# Patient Record
Sex: FEMALE | Race: WHITE | NOT HISPANIC OR LATINO | Employment: STUDENT | URBAN - METROPOLITAN AREA
[De-identification: names, ages, dates, MRNs, and addresses within clinical notes are randomized per-mention and may not be internally consistent; named-entity substitution may affect disease eponyms.]

---
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
## 2020-03-16 PROBLEM — N94.2 VAGINISMUS: Status: RESOLVED | Noted: 2019-05-13 | Resolved: 2020-03-16

## 2020-04-08 DIAGNOSIS — R61 PERSPIRATION EXCESSIVE: ICD-10-CM

## 2020-04-08 RX ORDER — ALUMINUM CHLORIDE 20 %
SOLUTION, NON-ORAL TOPICAL
Qty: 35 ML | Refills: 3 | Status: SHIPPED | OUTPATIENT
Start: 2020-04-08 | End: 2021-04-06

## 2020-04-08 NOTE — TELEPHONE ENCOUNTER
drysol soln.      Last Written Prescription Date:  11/29/19  Last Fill Quantity: 30 ml,   # refills: 3  Last Office Visit: 11/29/19 with Dr. Canada  Future Office visit:       Routing refill request to provider for review/approval because:  Pediatric protocol

## 2020-07-09 DIAGNOSIS — Z00.129 ENCOUNTER FOR ROUTINE CHILD HEALTH EXAMINATION W/O ABNORMAL FINDINGS: ICD-10-CM

## 2020-07-09 RX ORDER — NORGESTIMATE AND ETHINYL ESTRADIOL 0.25-0.035
KIT ORAL
Qty: 84 TABLET | Refills: 0 | Status: SHIPPED | OUTPATIENT
Start: 2020-07-09

## 2020-07-09 NOTE — TELEPHONE ENCOUNTER
Medication is being filled for 1 time refill only due to:  Patient needs to be seen because it has been more than one year since last visit.   Left message on answering machine for patient to call back to schedule follow up appt.    Charlotte Rucker RN

## 2020-09-24 DIAGNOSIS — Z00.129 ENCOUNTER FOR ROUTINE CHILD HEALTH EXAMINATION W/O ABNORMAL FINDINGS: ICD-10-CM

## 2020-09-24 NOTE — TELEPHONE ENCOUNTER
Left message on answering machine for patient to call back to schedule follow up appt.  (2nd request)  Will wait to fill.  Charlotte Rucker RN

## 2020-10-09 NOTE — TELEPHONE ENCOUNTER
Pt has not been seen by GYN since 3/2019.   Last saw Dr. Canada in 11/2019.    Will route to Dr. Canada's office to see if she wants to approve.    Joanne SONI RN

## 2020-10-09 NOTE — TELEPHONE ENCOUNTER
Pending Prescriptions:                       Disp   Refills    SPRINTEC 28 0.25-35 MG-MCG tablet [Pharmac*84 tab*0        Sig: TAKE 1 TABLET BY MOUTH EVERY DAY    Please see message below and advise    Routing refill request to provider for review/approval because:  Previously prescribed by OB/Gyn

## 2020-10-10 NOTE — TELEPHONE ENCOUNTER
Please contact patient and see if she would be willing to schedule a follow up appointment within next month.  I am willing to provide one prescription to allow time to re-establish care if appointment scheduled.  The appointment would need to be with alternative provide as I do not see 20 year olds.      Background of not being seen by prescribing service (OB) or pediatrics in > 1 year and no response obvious to message in August that she was on last refill till seen.

## 2020-10-12 RX ORDER — NORGESTIMATE AND ETHINYL ESTRADIOL 0.25-0.035
KIT ORAL
Qty: 84 TABLET | Refills: 0 | OUTPATIENT
Start: 2020-10-12

## 2020-10-12 NOTE — TELEPHONE ENCOUNTER
Attempted to contact patient. Left voice message to call back.     Also called patient's mom-on consent to communicate. Mom states patient is at college, but does not think patient is on birth control pills anymore. She states she will inform patient to schedule an appointment if she wants to resume birth control.     Request denied.

## 2021-02-14 ENCOUNTER — OFFICE VISIT (OUTPATIENT)
Dept: URGENT CARE | Facility: URGENT CARE | Age: 21
End: 2021-02-14
Payer: COMMERCIAL

## 2021-02-14 VITALS
WEIGHT: 125 LBS | DIASTOLIC BLOOD PRESSURE: 80 MMHG | HEART RATE: 90 BPM | TEMPERATURE: 98.1 F | BODY MASS INDEX: 20.83 KG/M2 | SYSTOLIC BLOOD PRESSURE: 118 MMHG | HEIGHT: 65 IN | RESPIRATION RATE: 12 BRPM

## 2021-02-14 DIAGNOSIS — K64.4 EXTERNAL HEMORRHOIDS: Primary | ICD-10-CM

## 2021-02-14 PROCEDURE — 99213 OFFICE O/P EST LOW 20 MIN: CPT | Performed by: PHYSICIAN ASSISTANT

## 2021-02-14 RX ORDER — TRIAMCINOLONE ACETONIDE 1 MG/G
CREAM TOPICAL
COMMUNITY
Start: 2021-02-10 | End: 2021-04-06

## 2021-02-14 ASSESSMENT — MIFFLIN-ST. JEOR: SCORE: 1337.88

## 2021-02-14 NOTE — PROGRESS NOTES
"URGENT CARE VISIT:    SUBJECTIVE:   Nelly Sanchez is a 20 year old female who presents with anal itching since 2 week(s) ago. Denies fever, constipation, changes in stool, abdominal pain and pelvic pain. Symptoms have been stable.  Treatment tried include none with no relief of symptoms. Hx of hemorrhoids.    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    Hymenectomy     Rectal prolapse 11/02    Rectal biopsy, scarification procedure     Allergies: No known allergies   Medications:   Current Outpatient Medications   Medication Sig Dispense Refill     SPRINTEC 28 0.25-35 MG-MCG tablet TAKE 1 TABLET BY MOUTH EVERY DAY 84 tablet 0     DRYSOL 20 % external solution APPLY TO AFFECTED AREA EVERY DAY AT BEDTIME (Patient not taking: Reported on 2/14/2021) 35 mL 3     triamcinolone (KENALOG) 0.1 % external cream        Social History:   Social History     Tobacco Use     Smoking status: Never Smoker     Smokeless tobacco: Never Used     Tobacco comment: No one smokes in family.   Substance Use Topics     Alcohol use: No         ROS:   Review of systems negative except as stated above.    OBJECTIVE:  /80   Pulse 90   Temp 98.1  F (36.7  C) (Oral)   Resp 12   Ht 1.651 m (5' 5\")   Wt 56.7 kg (125 lb)   LMP 02/01/2021   Breastfeeding No   BMI 20.80 kg/m    GENERAL APPEARANCE: healthy, alert and no distress  CV: regular rates and rhythm, normal S1 S2, no murmur noted  ABDOMEN:  soft, nontender, no HSM or masses and bowel sounds normal  Rectal exam: external hemorrhoids noted at 5 oclock  SKIN: no suspicious lesions or rashes  Genitourinary: external genitalia normal     ASSESSMENT:    ICD-10-CM    1. External hemorrhoids  K64.4        PLAN:  Patient Instructions   Patient was educated on the natural course of hemorrhoids.  Conservative measures discussed including hemorrhoid cream (Preparation H), Hydrocortisone cream, sitz bath, and high fiber diet. See your primary care provider " if symptoms do not improve in one week. Seek emergency care if you develop severe pain or bleeding     Patient verbalized understanding and is agreeable to plan. The patient was discharged ambulatory and in stable condition.    Sonia Alanis PA-C ....................  2/14/2021   10:30 AM

## 2021-02-14 NOTE — PATIENT INSTRUCTIONS
Patient was educated on the natural course of hemorrhoids.  Conservative measures discussed including hemorrhoid cream (Preparation H), Hydrocortisone cream, sitz bath, and high fiber diet. See your primary care provider if symptoms do not improve in one week. Seek emergency care if you develop severe pain or bleeding     Patient Education     Hemorrhoids    Hemorrhoids are swollen and inflamed veins inside the rectum and near the anus. The rectum is the last several inches of the colon. The anus is the passage between the rectum and the outside of the body.   Causes  The veins can become swollen due to increased pressure in them. This is most often caused by:     Chronic constipation or diarrhea    Straining when having a bowel movement    Sitting too long on the toilet    A low-fiber diet    Pregnancy  Symptoms    Bleeding from the rectum. You may notice this after bowel movements.    Lump near the anus    Itching around the anus    Pain around the anus    Mucus leaks from the anus  There are different types of hemorrhoids. Depending on the type you have and the severity, you may be able to treat yourself at home. In some cases, a procedure may be the best treatment option. Your healthcare provider can tell you more about this, if needed.   Home care  General care    To get relief from pain or itching, try:  ? Medicines. Your healthcare provider may recommend stool softeners, suppositories, or laxatives to help manage constipation. Use these exactly as directed.  ? Sitz baths. A sitz bath involves sitting in a few inches of warm bath water. Be careful not to make the water so hot that you burn yourself--test it before sitting in it. Soak for about 10 to 15 minutes a few times a day. This may help relieve pain.  ? Topical products. Your healthcare provider may prescribe or recommend creams, ointments, or pads that can be applied to the hemorrhoid. Use these exactly as directed.  Tips to help prevent hemorrhoids      Eat more fiber. Fiber adds bulk to stool and absorbs water as it moves through your colon. This makes stool softer and easier to pass.  ? Increase the fiber in your diet with more fiber-rich foods. These include fresh fruit, vegetables, and whole grains.  ? Take a fiber supplement or bulking agent, if advised by your healthcare provider. These include products such as psyllium or methylcellulose.    Drink more water. Your healthcare provider may direct you to drink plenty of water. This can help keep stool soft.    Be more active. Frequent exercise aids digestion and helps prevent constipation. It may also help make bowel movements more regular.    Don t strain during bowel movements. This can make hemorrhoids more likely. Also, don t sit on the toilet for long periods of time.    Follow-up care  Follow up with your healthcare provider as advised. If a culture or imaging tests were done, someone will let you know the results when they are ready. This may take a few days or longer. If your healthcare provider recommends a procedure for your hemorrhoids, these options can be discussed. Options may include surgery and outpatient office treatments.   When to seek medical advice  Call your healthcare provider right away if any of these occur:     Increased bleeding from the rectum    Increased pain around the rectum or anus    Weakness or dizziness  Call 911  Call 911 if any of these occur:     Trouble breathing or swallowing    Fainting or loss of consciousness    Unusually fast heart rate    Vomiting blood    Large amounts of blood in stool or black, tarry stools  Cathie last reviewed this educational content on 8/1/2019 2000-2020 [employer]. [address] [place] [address], [place] [address]. All rights reserved. This information is not intended as a substitute for professional medical care. Always follow your healthcare professional's instructions.

## 2021-02-19 ENCOUNTER — OFFICE VISIT (OUTPATIENT)
Dept: OBGYN | Facility: CLINIC | Age: 21
End: 2021-02-19
Payer: COMMERCIAL

## 2021-02-19 VITALS
WEIGHT: 128.2 LBS | BODY MASS INDEX: 21.33 KG/M2 | DIASTOLIC BLOOD PRESSURE: 76 MMHG | SYSTOLIC BLOOD PRESSURE: 124 MMHG | HEART RATE: 93 BPM

## 2021-02-19 DIAGNOSIS — L30.9 VULVAR DERMATITIS: Primary | ICD-10-CM

## 2021-02-19 DIAGNOSIS — B37.31 YEAST INFECTION OF THE VAGINA: Primary | ICD-10-CM

## 2021-02-19 DIAGNOSIS — N89.8 VAGINAL IRRITATION: ICD-10-CM

## 2021-02-19 LAB
SPECIMEN SOURCE: ABNORMAL
WET PREP SPEC: ABNORMAL

## 2021-02-19 PROCEDURE — 99214 OFFICE O/P EST MOD 30 MIN: CPT | Performed by: OBSTETRICS & GYNECOLOGY

## 2021-02-19 PROCEDURE — 87591 N.GONORRHOEAE DNA AMP PROB: CPT | Performed by: OBSTETRICS & GYNECOLOGY

## 2021-02-19 PROCEDURE — 87210 SMEAR WET MOUNT SALINE/INK: CPT | Performed by: OBSTETRICS & GYNECOLOGY

## 2021-02-19 PROCEDURE — 87491 CHLMYD TRACH DNA AMP PROBE: CPT | Performed by: OBSTETRICS & GYNECOLOGY

## 2021-02-19 RX ORDER — CLOBETASOL PROPIONATE 0.5 MG/G
OINTMENT TOPICAL 2 TIMES DAILY
Qty: 60 G | Refills: 0 | Status: SHIPPED | OUTPATIENT
Start: 2021-02-19 | End: 2021-03-05

## 2021-02-19 RX ORDER — FLUCONAZOLE 150 MG/1
150 TABLET ORAL ONCE
Qty: 1 TABLET | Refills: 0 | Status: SHIPPED | OUTPATIENT
Start: 2021-02-19 | End: 2021-02-19

## 2021-02-19 RX ORDER — ULIPRISTAL ACETATE 30 MG/1
TABLET ORAL
COMMUNITY
Start: 2021-01-15

## 2021-02-19 SDOH — HEALTH STABILITY: MENTAL HEALTH: HOW MANY STANDARD DRINKS CONTAINING ALCOHOL DO YOU HAVE ON A TYPICAL DAY?: NOT ASKED

## 2021-02-19 SDOH — HEALTH STABILITY: MENTAL HEALTH: HOW OFTEN DO YOU HAVE A DRINK CONTAINING ALCOHOL?: NOT ASKED

## 2021-02-19 SDOH — HEALTH STABILITY: MENTAL HEALTH: HOW OFTEN DO YOU HAVE 6 OR MORE DRINKS ON ONE OCCASION?: NOT ASKED

## 2021-02-19 NOTE — NURSING NOTE
"Chief Complaint   Patient presents with     Vaginal Problem     itching outer labia X 3 weeks   Seen 2 times at [facility] for         Initial /76 (BP Location: Left arm, Cuff Size: Adult Regular)   Pulse 93   Wt 58.2 kg (128 lb 3.2 oz)   LMP 2021 (Exact Date)   Breastfeeding No   BMI 21.33 kg/m   Estimated body mass index is 21.33 kg/m  as calculated from the following:    Height as of 21: 1.651 m (5' 5\").    Weight as of this encounter: 58.2 kg (128 lb 3.2 oz).  BP completed using cuff size: regular    Questioned patient about current smoking habits.  Pt. has never smoked.          The following HM Due: NONE      Piedad Ryan CMA on 2021 at 1:24 PM       "

## 2021-02-19 NOTE — RESULT ENCOUNTER NOTE
Inform patient that there was yeast found on her wet prep.   She is to take a medication to help treat this and I will prescribe it to her.     Elif Dill MD

## 2021-02-19 NOTE — PROGRESS NOTES
Vaginal irritation/itching    Ms. Nelly MARIO Relling 20 year old G0 presents for vaginal irritation of 3 weeks duration. She had sought medical evaluation at her [facility] clinic and they performed wet prep testing that was negative. She was prescribed topical triamcinolone cream BID dosing x 2 weeks and reports only minimal improvement in symptoms. Of note, she has a history of vaginismus with remote history of hymenectomy. She is sexually active with a male partner but is not engaging at the moment in penetrative intercourse but oral intercourse with her male partner. She has tried to make adjustments to healthier vulvar hygiene practices as counseled to her by her [facility] clinic RN, but this has not lead to any changes in her symptoms. She denies vaginal odor and discharge.     Of note, in the midst of discussing evaluation for her concerns, she expressed that she would decline speculum exam or insertion of vaginal swab into the vagina in part because of her vaginismus issues.       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 Trent Muro - [facility]        Current Outpatient Medications   Medication     clobetasol (TEMOVATE) 0.05 % external ointment     SPRINTEC 28 0.25-35 MG-MCG tablet     triamcinolone (KENALOG) 0.1 % external cream     DRYSOL 20 % external solution     NELLY 30 MG tablet     No current facility-administered medications for this visit.        Allergies   Allergen Reactions     No Known Allergies        Social History     Tobacco Use     Smoking status: Never Smoker     Smokeless tobacco: Never Used     Tobacco comment: No one smokes in family.   Substance Use Topics     Alcohol use: Yes     Comment: socially     Drug use: No        Family History   Problem Relation Age of Onset     Thyroid Disease Mother         hypo thyroid     Lipids Mother      Lipids Father        C: NEGATIVE for fever, chills, change in weight  HEENT: NEGATIVE for visual changes, runny nose, epistaxis, ear pain, tinnitus, tooth ache, sore throat, difficulty with swallowing, sinus pain  R: NEGATIVE for significant cough or SOB  CV: NEGATIVE for chest pain, palpitations or peripheral edema  BREAST: NEGATIVE For soreness, pain, lumps or nipple discharge  GI: NEGATIVE for nausea, abdominal pain, heartburn, or change in bowel habits  : NEGATIVE for frequency, dysuria, hematuria, vaginal discharge, or irregular bleeding  Neuro: NEGATIVE for numbness, tingling, focal weakness, or headache  INT: NEGATIVE for rashes, lesions or pruritis   PSYCH: NEGATIVE for anxiety, depression, or halluciinations    Exam:   /76 (BP Location: Left arm, Cuff Size: Adult Regular)   Pulse 93   Wt 58.2 kg (128 lb 3.2 oz)   LMP 02/01/2021 (Exact Date)   Breastfeeding No   BMI 21.33 kg/m    General Appearance: Well nourished, well developed female, NAD, AOx3  Neurological: Mental Status Normal and Station and Gait Normal   Skin: Normal skin turgor  HEET: Atraumatic, normocephalic, EOMI  Neck: Supple,no adenopathy,thyroid normal  Lungs: Good respiratory effort  Abdomen: Soft, NT, ND, no masses  Pelvic: External female genitalia remarkable for mild erythema noted in the vulva bilaterally within the region bordering the labia majora and minora. Also mild erythema noted in the perineum.  No other external lesions, normal hair distribution, no adenopathy. Speculum exam and bimanual exam deferred  Extremities: No clubbing, no cyanosis and no edema    A/P: Vulvar dermitis  -- based on clinical exam and mild improvement noted with triamcinolone  -- recommended higher potency topical steroid and therefore, clobetasol ointment prescribed; made it a point that ointment would be more affective  than cream given alcohol base related to cream formulation which could elicit more irritation; tapering regimen recommended  -- self collect wet prep and urine GC/CT cultures collected; will treat accordingly based on results    Total time spent was 30 minutes on the date of the encounter in chart review, patient visit, review of tests, documentation and counseling on the above medical condition.    Elif Dill MD  [facility]

## 2021-02-19 NOTE — RESULT ENCOUNTER NOTE
Also, I would recommend that she hold off on the steroid ointment and if the diflucan treats her condition then she does not need to start the steroid ointment     Elif Dill MD,

## 2021-04-06 ENCOUNTER — OFFICE VISIT (OUTPATIENT)
Dept: OBGYN | Facility: CLINIC | Age: 21
End: 2021-04-06
Payer: COMMERCIAL

## 2021-04-06 VITALS
WEIGHT: 119 LBS | SYSTOLIC BLOOD PRESSURE: 120 MMHG | DIASTOLIC BLOOD PRESSURE: 68 MMHG | HEART RATE: 64 BPM | HEIGHT: 65 IN | BODY MASS INDEX: 19.83 KG/M2

## 2021-04-06 DIAGNOSIS — Z12.4 PAP SMEAR FOR CERVICAL CANCER SCREENING: ICD-10-CM

## 2021-04-06 DIAGNOSIS — N90.89 VULVAR IRRITATION: Primary | ICD-10-CM

## 2021-04-06 DIAGNOSIS — N94.10 DYSPAREUNIA IN FEMALE: ICD-10-CM

## 2021-04-06 LAB
SPECIMEN SOURCE: NORMAL
WET PREP SPEC: NORMAL

## 2021-04-06 PROCEDURE — 87210 SMEAR WET MOUNT SALINE/INK: CPT | Performed by: OBSTETRICS & GYNECOLOGY

## 2021-04-06 PROCEDURE — G0145 SCR C/V CYTO,THINLAYER,RESCR: HCPCS | Performed by: OBSTETRICS & GYNECOLOGY

## 2021-04-06 PROCEDURE — 99214 OFFICE O/P EST MOD 30 MIN: CPT | Performed by: OBSTETRICS & GYNECOLOGY

## 2021-04-06 PROCEDURE — 87102 FUNGUS ISOLATION CULTURE: CPT | Performed by: OBSTETRICS & GYNECOLOGY

## 2021-04-06 RX ORDER — LIDOCAINE 50 MG/G
OINTMENT TOPICAL
Qty: 30 G | Refills: 2 | Status: SHIPPED | OUTPATIENT
Start: 2021-04-06

## 2021-04-06 ASSESSMENT — MIFFLIN-ST. JEOR: SCORE: 1305.66

## 2021-04-06 NOTE — PROGRESS NOTES
SUBJECTIVE:       Nelly Sanchez is a 21 year old female  who presents to clinic today for persistent vulvar itching and also inability to have intercourse due to pain with insertion.    Prior vulvar dermatoses diagnosed? NO  Duration of symptoms: few months  Location of symptoms: introitus    Itching? YES started out very intense, now occasional and actually none today.  Redness/change in color? NO  Fissures, sores, lesions? NO  Pain with intercourse? YES has not been able to have vaginal intercourse   Discharge? NO  Odor? NO    Treatments tried: OTC antifungal and Diflucan 150mg po once. Was first treated with diflucan, then did over the counter monistat. Felt better in both cases for a few days, then feels that symptoms returned. They are much milder now than at first. One wet prep was positive at her school clinic. She was treated with a double dose of diflucan and feels that helped the most. She sat in a bath with baking soda yesterday and feels that helped a lot.    Treatments tried for vaginismus/pain with attempting intercourse: pelvic floor PT and dilators. Can now use a dilator without too much pain. Can use tampons. Has not had a pap smear or pelvic exam before.    Inciting event or infection?   YES as above.    Problem list and histories reviewed & adjusted, as indicated.  Additional history: as documented.    Patient Active Problem List   Diagnosis     Family history of hypercholesterolemia     Chronic constipation     Shoulder problem     Right shoulder pain, unspecified chronicity     Tight hymenal ring     Past Surgical History:   Procedure Laterality Date     AS PARTIAL HYMENECTOMY/REVISION HYMENAL RING  2018      RECTAL SCARIFICATION      Dr. Trent Muro - [facility]       Social History     Tobacco Use     Smoking status: Never Smoker     Smokeless tobacco: Never Used     Tobacco comment: No one smokes in family.   Substance Use Topics     Alcohol use: Yes      Comment: socially      Problem (# of Occurrences) Relation (Name,Age of Onset)    Lipids (2) Mother, Father    Thyroid Disease (1) Mother: hypo thyroid            SPRINTEC 28 0.25-35 MG-MCG tablet, TAKE 1 TABLET BY MOUTH EVERY DAY  NELLY 30 MG tablet,     No current facility-administered medications on file prior to visit.     Allergies   Allergen Reactions     No Known Allergies        Further ROS: Skin: No rashes,worrisome lesions or skin problems      OBJECTIVE:     Exam:  Constitutional:  Appearance: Well nourished, well developed alert, in no acute distress  Chest:  Respiratory Effort:  Breathing unlabored  Skin:General Inspection:  No rashes present, no lesions present, no areas of discoloration  Neurologic/Psychiatric:  Mental Status:  Oriented X3     Mons pubis Normal   Groin Normal   Labia majora Normal   Perineum Normal   Anus Normal   Labia minora Normal   Prepuce Normal   Clitoris Normal   Intralabial folds Very slight erythema  Vestibule Abnormal: slightly erythematous, nontender to q tip touch, slightly tender to digital touch   Urethral meatus Normal   Vagina Normal   Pelvic floor tone normal to slightly increased   I was able to visualize the cervix with a small pediatric speculum, and pap smear was done.      In-Clinic Test Results:  No results found for this or any previous visit (from the past 24 hour(s)).    ASSESSMENT/PLAN:                                                        ICD-10-CM    1. Vulvar irritation  N90.89 lidocaine (XYLOCAINE) 5 % external ointment     Wet prep     Yeast culture   2. Dyspareunia in female  N94.10 lidocaine (XYLOCAINE) 5 % external ointment     Yeast culture   3. Pap smear for cervical cancer screening  Z12.4 Pap imaged thin layer screen only - recommended age 21 - 24 years         Discussed that vulvar/vaginal pain of a chronic nature frequently is multifactorial in origin, and therefore often requires a multidisciplinary approach for optimal treatment. This may  include medication (hormonal and otherwise), physical therapy, pain clinic, and in some refractory cases, psychologic treatment. Because this develops over a period of time, there will not be an immediate recovery with any of these options over the short term. The goal is always to maximize function and minimize symptoms, to the extent that is possible.     Risks, benefits, and alternatives to options were discussed today. She was given written information on vulvar pain and vulvar hygiene. Decision was made to proceed with yeast culture to rule out non Candida, and to try application of lidocaine at the introitus before attemtping intercourse.  If she continues to have vulvar symptoms despite negative yeast cultures, or continues to have pain with intercourse, we will need to follow up again. She is in agreement with the plan.     Prescriptions provided: lidocaine.    Stop: all other topicals and body washes. Use Dove or Basis soap on the body and NO soap on the vulva. See patient instructions.      Kelli Gomez MD  [facility]

## 2021-04-06 NOTE — NURSING NOTE
"Chief Complaint   Patient presents with     Vaginal Itching     Follow up for vaginal irritation, pain and redness. Not using any treatment currently. Diflucan \"delilah\" helpful in the past. Denies discharge or odor.       Initial /68   Pulse 64   Ht 1.651 m (5' 5\")   Wt 54 kg (119 lb)   LMP 2021   Breastfeeding No   BMI 19.80 kg/m   Estimated body mass index is 19.8 kg/m  as calculated from the following:    Height as of this encounter: 1.651 m (5' 5\").    Weight as of this encounter: 54 kg (119 lb).  BP completed using cuff size: regular    Questioned patient about current smoking habits.  Pt. has never smoked.          The following HM Due: pap smear - will schedule for later date      The following patient reported/Care Every where data was sent to:  P ABSTRACT QUALITY INITIATIVES [[id number]]  Karina Falk LPN             "

## 2021-04-08 ENCOUNTER — TELEPHONE (OUTPATIENT)
Dept: OBGYN | Facility: CLINIC | Age: 21
End: 2021-04-08

## 2021-04-08 LAB
COPATH REPORT: NORMAL
PAP: NORMAL

## 2021-04-12 LAB
Lab: NORMAL
SPECIMEN SOURCE: NORMAL
YEAST SPEC QL CULT: NORMAL

## 2021-08-30 ENCOUNTER — OFFICE VISIT (OUTPATIENT)
Dept: OBGYN | Facility: CLINIC | Age: 21
End: 2021-08-30
Payer: COMMERCIAL

## 2021-08-30 VITALS — DIASTOLIC BLOOD PRESSURE: 82 MMHG | BODY MASS INDEX: 18.87 KG/M2 | SYSTOLIC BLOOD PRESSURE: 122 MMHG | WEIGHT: 113.4 LBS

## 2021-08-30 DIAGNOSIS — N90.89 VULVAR IRRITATION: ICD-10-CM

## 2021-08-30 DIAGNOSIS — N94.89 VAGINAL BURNING: ICD-10-CM

## 2021-08-30 DIAGNOSIS — N94.89 VULVAR BURNING: Primary | ICD-10-CM

## 2021-08-30 LAB
CLUE CELLS: ABNORMAL
TRICHOMONAS, WET PREP: ABNORMAL
WBC'S/HIGH POWER FIELD, WET PREP: ABNORMAL
YEAST, WET PREP: ABNORMAL

## 2021-08-30 PROCEDURE — 99213 OFFICE O/P EST LOW 20 MIN: CPT | Performed by: OBSTETRICS & GYNECOLOGY

## 2021-08-30 PROCEDURE — 87210 SMEAR WET MOUNT SALINE/INK: CPT | Performed by: OBSTETRICS & GYNECOLOGY

## 2021-08-30 RX ORDER — CLOBETASOL PROPIONATE 0.5 MG/G
OINTMENT TOPICAL 2 TIMES DAILY
Qty: 45 G | Refills: 0 | Status: SHIPPED | OUTPATIENT
Start: 2021-08-30

## 2021-08-30 NOTE — PROGRESS NOTES
Vulvar burning     In the last 2-3 weeks reports worsening burning sensation at the external vagina as well as perineal region. More recently, it has worsened to such an intensity that she is unable to walk as walking triggers worsening burning. She states that it is alleviating for her to not walk and the symptoms subside. She denies the need to itch the area and denies vaginal discharge. She had tried application of a topical combined triamcinolone and nystatin cream, but this did not help relieve her symptoms.   Prior to this worsening burning sensation, she states that she has had fairly good control of her vulvar symptoms for 8 weeks. She has being a combined regimen topical gabapentin and lidocaine twice daily as well as partaking pelvic physical therapy. She reports penetrative sexual activity as well as toleration of tampon use.   She expresses frustration and gets teary eyed when she wonders why there is a flare of this vulvar burning that is happening when it has been going so well.     /82 (BP Location: Left arm, Cuff Size: Adult Regular)   Wt 51.4 kg (113 lb 6.4 oz)   LMP 08/16/2021 (Approximate)   Breastfeeding No   BMI 18.87 kg/m      General Appearance: NAD  Pelvic: External female genitalia remarkable for very mild erythema at the perineum. Otherwise, no external lesions, normal hair distribution, no adenopathy. Speculum and Bimanual exam deferred.        A/P: Vulvar burning  -- instructed to do perform self wet prep collection to rule out benign infections causing symptoms   -- if negative, then will do trial of clobetasol ointment to see if this will resolve her burning although dermatitis not clearly obvious on clinical exam  -- I also discussed with patient possibly a neuropathic component to her symptoms that may not be well addressed with the current concentration of above combined topical gabapentin and lidocaine regimen; encouraged for her to return to the provider that prescribed  that medication to discuss possibly increase in concentration to assess for improvement in symptoms     Total time spent was 20 minutes on the date of the encounter in chart review, patient visit, review of tests, documentation and counseling on the above medical condition.    Elif Dill MD  [facility]

## 2021-08-30 NOTE — NURSING NOTE
"Chief Complaint   Patient presents with     Vaginal Problem     Vaginal burning on outer labia         Initial /82 (BP Location: Left arm, Cuff Size: Adult Regular)   Wt 51.4 kg (113 lb 6.4 oz)   LMP 2021 (Approximate)   Breastfeeding No   BMI 18.87 kg/m   Estimated body mass index is 18.87 kg/m  as calculated from the following:    Height as of 21: 1.651 m (5' 5\").    Weight as of this encounter: 51.4 kg (113 lb 6.4 oz).  BP completed using cuff size: regular    Questioned patient about current smoking habits.  Pt. has never smoked.          The following HM Due: NONE      Piedad Ryan, MEGAN on 2021 at 1:05 PM     "

## 2022-01-08 ENCOUNTER — HEALTH MAINTENANCE LETTER (OUTPATIENT)
Age: 22
End: 2022-01-08

## 2022-11-19 ENCOUNTER — HEALTH MAINTENANCE LETTER (OUTPATIENT)
Age: 22
End: 2022-11-19

## 2023-04-15 ENCOUNTER — HEALTH MAINTENANCE LETTER (OUTPATIENT)
Age: 23
End: 2023-04-15

## 2024-06-16 ENCOUNTER — HEALTH MAINTENANCE LETTER (OUTPATIENT)
Age: 24
End: 2024-06-16